# Patient Record
Sex: FEMALE | URBAN - METROPOLITAN AREA
[De-identification: names, ages, dates, MRNs, and addresses within clinical notes are randomized per-mention and may not be internally consistent; named-entity substitution may affect disease eponyms.]

---

## 2019-03-19 NOTE — PATIENT DISCUSSION
The patient was informed that with [address] [facility] for distance, they will need glasses for all near and intermediate activities after surgery. The patient understands there is a possibility they may need an enhancement after surgery. The patient elects [employer] OD, goal of emmetropia.

## 2019-03-29 NOTE — PATIENT DISCUSSION
The patient was informed that with [address] [facility] for distance, they will need glasses for all near and intermediate activities after surgery. The patient understands there is a possibility they may need an enhancement after surgery. The patient elects [employer] OD, goal of emmetropia.

## 2019-03-29 NOTE — PATIENT DISCUSSION
The patient was informed that with [address] [facility] for distance, they will need glasses for all near and intermediate activities after surgery. The patient understands there is a possibility they may need an enhancement after surgery. The patient elects [employer] OD, goal of emmetropia.

## 2019-04-01 NOTE — PATIENT DISCUSSION
Cataract surgery has been performed in the first eye and activities of daily living are still impaired. The patient would like to proceed with cataract surgery in the second eye as scheduled. The patient elects CV OS, goal of emmetropia.

## 2019-04-01 NOTE — PATIENT DISCUSSION
The patient was told (DWS) their pupil does not dilate well which carries an increased risk of surgical complications. Cyclogyl with surgery.

## 2019-04-03 NOTE — PATIENT DISCUSSION
Cataract surgery has been performed in the first eye and activities of daily living are still impaired. The patient would like to proceed with cataract surgery in the second eye as scheduled. The patient elects custom OS, goal of emmetropia.

## 2019-04-24 NOTE — PROCEDURE NOTE: CLINICAL
PROCEDURE NOTE: Epilation Right Lower Lid. Diagnosis: Trichiasis without Entropion. Anesthesia: Topical. Prior to treatment, the risks/benefits/alternatives were discussed. The patient wished to proceed with procedure. Aberrant lashes removed from RLL lid(s) using microforcep. Patient tolerated procedure well. There were no complications. Post-op instructions given. Boo Fitch

## 2019-04-24 NOTE — PATIENT DISCUSSION
The patient is 1 month status post cataract surgery. The eye has healed well with no signs of infection or inflammation. All surgery associated drops may be stopped. Patient declines a glasses prescription.

## 2020-01-27 NOTE — PATIENT DISCUSSION
The patient's findings are compatible with epiretinal membrane with macular pucker. Macular pucker is usually due to previous posterior vitreous detachment but can also be due to uveitis, retinal vascular occlusion, retinal tear, retinal detachment, and idiopathic. Most patients with epiretinal membranes with macular pucker do not progress to the point where intervention is needed. Intervention is typically surgical. The patient can be observed carefully with retinal follow-up in 1 year. If the maculopathy progresses, surgery will be considered.

## 2020-12-03 ENCOUNTER — IMPORTED ENCOUNTER (OUTPATIENT)
Dept: URBAN - METROPOLITAN AREA CLINIC 9 | Facility: CLINIC | Age: 50
End: 2020-12-03

## 2021-01-14 ENCOUNTER — IMPORTED ENCOUNTER (OUTPATIENT)
Dept: URBAN - METROPOLITAN AREA CLINIC 9 | Facility: CLINIC | Age: 51
End: 2021-01-14

## 2021-01-14 PROBLEM — H04.123: Noted: 2021-01-14

## 2021-10-16 ASSESSMENT — VISUAL ACUITY
OS_SC: 20/40 SN
OS_CC: 20/40 SN
OD_PH: 20/40 + SN
OD_SC: 20/40 - SN
OD_CC: 20/20 SN
OD_CC: 20/40 SN
OS_CC: 20/20 SN

## 2021-10-16 ASSESSMENT — TONOMETRY
OD_IOP_MMHG: 11
OS_IOP_MMHG: 15
OD_IOP_MMHG: 12
OS_IOP_MMHG: 11

## 2021-10-16 ASSESSMENT — KERATOMETRY
OD_K1POWER_DIOPTERS: 45.25
OS_K1POWER_DIOPTERS: 44.5
OS_AXISANGLE_DEGREES: 6
OD_AXISANGLE_DEGREES: 174
OD_K2POWER_DIOPTERS: 46.5
OS_AXISANGLE2_DEGREES: 96
OS_K2POWER_DIOPTERS: 45.5
OD_AXISANGLE2_DEGREES: 84

## 2023-12-04 ENCOUNTER — ESTABLISHED PATIENT (OUTPATIENT)
Facility: LOCATION | Age: 53
End: 2023-12-04

## 2023-12-04 DIAGNOSIS — H04.123: ICD-10-CM

## 2023-12-04 DIAGNOSIS — H52.4: ICD-10-CM

## 2023-12-04 PROCEDURE — 92014 COMPRE OPH EXAM EST PT 1/>: CPT

## 2023-12-04 PROCEDURE — 92015 DETERMINE REFRACTIVE STATE: CPT

## 2023-12-04 ASSESSMENT — VISUAL ACUITY
OS_SC: 20/60+1
OD_SC: 20/40

## 2023-12-04 ASSESSMENT — TONOMETRY
OS_IOP_MMHG: 16
OD_IOP_MMHG: 16

## 2024-12-11 ENCOUNTER — COMPREHENSIVE EXAM (OUTPATIENT)
Age: 54
End: 2024-12-11

## 2024-12-11 DIAGNOSIS — H52.4: ICD-10-CM

## 2024-12-11 PROCEDURE — 92015 DETERMINE REFRACTIVE STATE: CPT

## 2024-12-11 PROCEDURE — 92014 COMPRE OPH EXAM EST PT 1/>: CPT

## 2025-08-19 ENCOUNTER — EMERGENCY VISIT (OUTPATIENT)
Age: 55
End: 2025-08-19

## 2025-08-19 DIAGNOSIS — H20.021: ICD-10-CM

## 2025-08-19 PROCEDURE — 99214 OFFICE O/P EST MOD 30 MIN: CPT
